# Patient Record
(demographics unavailable — no encounter records)

---

## 2025-01-06 NOTE — REASON FOR VISIT
[Symptom and Test Evaluation] : symptom and test evaluation [CV Risk Factors and Non-Cardiac Disease] : CV risk factors and non-cardiac disease [Arrhythmia/ECG Abnorrmalities] : arrhythmia/ECG abnormalities [FreeTextEntry1] : Dear Dr. Patricio:  I had the pleasure of re-evaluating Mr. Lazaro Roy for f/u cardiovascular consultation. I last saw him in September 2024.  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 Tashi Clemente after noting a 10% PVC burden on ambulatory cardiac monitoring with persistent symptoms.  Since then, his symptoms have recurred to some degree, but controlled with beta blocker therapy.  He has seen Dr. Clemente previously who felt this is the best approach for now.  Other additional testing performed at the OSH within the past year include an unremarkable cardiac MRI (Dec 2023) as well as a normal coronary CTA.  Since his last visit, he now reports progressively worsening palpitations again. He has self-increased the dose of Toprol XL 25 to twice daily with minimal improvement in symptoms. He notes having a recent URI/cough that is slowly improving, and might have been the trigger this time.  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   No major recent illnesses except for COVID within the past several years. No significant FH early CAD/CVA/SCD/VTE.  Denies having exertional limitations. Admits to anxiety.  My review of his 12-lead ECG shows sinus bradycardia at 56 bpm, LAFB, RBBB. Physical exam was unremarkable; he appears euvolemic on exam.  At this time, we will arrange for TTE and ambulatory cardiac monitoring x 2 weeks with a Zio patch. He will continue to take metoprolol. Can consider repeating cardiac MRI and EP evaluation if anticipated PVC burden is high and symptoms persist despite medical therapy.  Dr. Patricio, thank you for involving me in his care.  Warmest regards, Jose Luis Pizano